# Patient Record
Sex: MALE | Race: WHITE | NOT HISPANIC OR LATINO | URBAN - METROPOLITAN AREA
[De-identification: names, ages, dates, MRNs, and addresses within clinical notes are randomized per-mention and may not be internally consistent; named-entity substitution may affect disease eponyms.]

---

## 2017-02-28 ENCOUNTER — OUTPATIENT (OUTPATIENT)
Dept: OUTPATIENT SERVICES | Facility: HOSPITAL | Age: 72
LOS: 1 days | Discharge: HOME | End: 2017-02-28

## 2017-06-27 DIAGNOSIS — R94.31 ABNORMAL ELECTROCARDIOGRAM [ECG] [EKG]: ICD-10-CM

## 2021-10-28 ENCOUNTER — INPATIENT (INPATIENT)
Facility: HOSPITAL | Age: 76
LOS: 1 days | Discharge: HOME HEALTH | End: 2021-10-30
Attending: INTERNAL MEDICINE | Admitting: INTERNAL MEDICINE
Payer: MEDICARE

## 2021-10-28 VITALS
TEMPERATURE: 98 F | OXYGEN SATURATION: 98 % | DIASTOLIC BLOOD PRESSURE: 78 MMHG | RESPIRATION RATE: 18 BRPM | HEART RATE: 75 BPM | SYSTOLIC BLOOD PRESSURE: 148 MMHG

## 2021-10-28 LAB
ALBUMIN SERPL ELPH-MCNC: 4.1 G/DL — SIGNIFICANT CHANGE UP (ref 3.5–5.2)
ALP SERPL-CCNC: 98 U/L — SIGNIFICANT CHANGE UP (ref 30–115)
ALT FLD-CCNC: <5 U/L — SIGNIFICANT CHANGE UP (ref 0–41)
ANION GAP SERPL CALC-SCNC: 12 MMOL/L — SIGNIFICANT CHANGE UP (ref 7–14)
AST SERPL-CCNC: 14 U/L — SIGNIFICANT CHANGE UP (ref 0–41)
BASOPHILS # BLD AUTO: 0.03 K/UL — SIGNIFICANT CHANGE UP (ref 0–0.2)
BASOPHILS NFR BLD AUTO: 0.4 % — SIGNIFICANT CHANGE UP (ref 0–1)
BILIRUB SERPL-MCNC: 0.8 MG/DL — SIGNIFICANT CHANGE UP (ref 0.2–1.2)
BUN SERPL-MCNC: 18 MG/DL — SIGNIFICANT CHANGE UP (ref 10–20)
CALCIUM SERPL-MCNC: 9.1 MG/DL — SIGNIFICANT CHANGE UP (ref 8.5–10.1)
CHLORIDE SERPL-SCNC: 102 MMOL/L — SIGNIFICANT CHANGE UP (ref 98–110)
CO2 SERPL-SCNC: 25 MMOL/L — SIGNIFICANT CHANGE UP (ref 17–32)
CREAT SERPL-MCNC: 1.1 MG/DL — SIGNIFICANT CHANGE UP (ref 0.7–1.5)
EOSINOPHIL # BLD AUTO: 0.04 K/UL — SIGNIFICANT CHANGE UP (ref 0–0.7)
EOSINOPHIL NFR BLD AUTO: 0.5 % — SIGNIFICANT CHANGE UP (ref 0–8)
GLUCOSE SERPL-MCNC: 110 MG/DL — HIGH (ref 70–99)
HCT VFR BLD CALC: 45 % — SIGNIFICANT CHANGE UP (ref 42–52)
HGB BLD-MCNC: 15.4 G/DL — SIGNIFICANT CHANGE UP (ref 14–18)
IMM GRANULOCYTES NFR BLD AUTO: 0.4 % — HIGH (ref 0.1–0.3)
LYMPHOCYTES # BLD AUTO: 0.86 K/UL — LOW (ref 1.2–3.4)
LYMPHOCYTES # BLD AUTO: 11.4 % — LOW (ref 20.5–51.1)
MAGNESIUM SERPL-MCNC: 2.3 MG/DL — SIGNIFICANT CHANGE UP (ref 1.8–2.4)
MCHC RBC-ENTMCNC: 30.8 PG — SIGNIFICANT CHANGE UP (ref 27–31)
MCHC RBC-ENTMCNC: 34.2 G/DL — SIGNIFICANT CHANGE UP (ref 32–37)
MCV RBC AUTO: 90 FL — SIGNIFICANT CHANGE UP (ref 80–94)
MONOCYTES # BLD AUTO: 0.43 K/UL — SIGNIFICANT CHANGE UP (ref 0.1–0.6)
MONOCYTES NFR BLD AUTO: 5.7 % — SIGNIFICANT CHANGE UP (ref 1.7–9.3)
NEUTROPHILS # BLD AUTO: 6.16 K/UL — SIGNIFICANT CHANGE UP (ref 1.4–6.5)
NEUTROPHILS NFR BLD AUTO: 81.6 % — HIGH (ref 42.2–75.2)
NRBC # BLD: 0 /100 WBCS — SIGNIFICANT CHANGE UP (ref 0–0)
PLATELET # BLD AUTO: 203 K/UL — SIGNIFICANT CHANGE UP (ref 130–400)
POTASSIUM SERPL-MCNC: 4.8 MMOL/L — SIGNIFICANT CHANGE UP (ref 3.5–5)
POTASSIUM SERPL-SCNC: 4.8 MMOL/L — SIGNIFICANT CHANGE UP (ref 3.5–5)
PROT SERPL-MCNC: 6.8 G/DL — SIGNIFICANT CHANGE UP (ref 6–8)
RBC # BLD: 5 M/UL — SIGNIFICANT CHANGE UP (ref 4.7–6.1)
RBC # FLD: 12.8 % — SIGNIFICANT CHANGE UP (ref 11.5–14.5)
SARS-COV-2 RNA SPEC QL NAA+PROBE: SIGNIFICANT CHANGE UP
SODIUM SERPL-SCNC: 139 MMOL/L — SIGNIFICANT CHANGE UP (ref 135–146)
TROPONIN T SERPL-MCNC: <0.01 NG/ML — SIGNIFICANT CHANGE UP
WBC # BLD: 7.55 K/UL — SIGNIFICANT CHANGE UP (ref 4.8–10.8)
WBC # FLD AUTO: 7.55 K/UL — SIGNIFICANT CHANGE UP (ref 4.8–10.8)

## 2021-10-28 PROCEDURE — 99223 1ST HOSP IP/OBS HIGH 75: CPT

## 2021-10-28 PROCEDURE — 70450 CT HEAD/BRAIN W/O DYE: CPT | Mod: 26,MA

## 2021-10-28 PROCEDURE — 93010 ELECTROCARDIOGRAM REPORT: CPT

## 2021-10-28 PROCEDURE — 71045 X-RAY EXAM CHEST 1 VIEW: CPT | Mod: 26,77

## 2021-10-28 PROCEDURE — 99285 EMERGENCY DEPT VISIT HI MDM: CPT

## 2021-10-28 PROCEDURE — 71045 X-RAY EXAM CHEST 1 VIEW: CPT | Mod: 26

## 2021-10-28 PROCEDURE — 72170 X-RAY EXAM OF PELVIS: CPT | Mod: 26

## 2021-10-28 RX ORDER — CARBIDOPA AND LEVODOPA 25; 100 MG/1; MG/1
1 TABLET ORAL
Qty: 0 | Refills: 0 | DISCHARGE

## 2021-10-28 RX ORDER — LINACLOTIDE 145 UG/1
1 CAPSULE, GELATIN COATED ORAL
Qty: 0 | Refills: 0 | DISCHARGE

## 2021-10-28 RX ORDER — CARBIDOPA AND LEVODOPA 25; 100 MG/1; MG/1
1 TABLET ORAL
Refills: 0 | Status: DISCONTINUED | OUTPATIENT
Start: 2021-10-28 | End: 2021-10-30

## 2021-10-28 RX ORDER — TETANUS TOXOID, REDUCED DIPHTHERIA TOXOID AND ACELLULAR PERTUSSIS VACCINE, ADSORBED 5; 2.5; 8; 8; 2.5 [IU]/.5ML; [IU]/.5ML; UG/.5ML; UG/.5ML; UG/.5ML
0.5 SUSPENSION INTRAMUSCULAR ONCE
Refills: 0 | Status: COMPLETED | OUTPATIENT
Start: 2021-10-28 | End: 2021-10-28

## 2021-10-28 RX ORDER — MIDODRINE HYDROCHLORIDE 2.5 MG/1
5 TABLET ORAL THREE TIMES A DAY
Refills: 0 | Status: DISCONTINUED | OUTPATIENT
Start: 2021-10-28 | End: 2021-10-30

## 2021-10-28 RX ORDER — FLUDROCORTISONE ACETATE 0.1 MG/1
0.1 TABLET ORAL DAILY
Refills: 0 | Status: DISCONTINUED | OUTPATIENT
Start: 2021-10-28 | End: 2021-10-30

## 2021-10-28 RX ORDER — ENOXAPARIN SODIUM 100 MG/ML
40 INJECTION SUBCUTANEOUS DAILY
Refills: 0 | Status: DISCONTINUED | OUTPATIENT
Start: 2021-10-28 | End: 2021-10-30

## 2021-10-28 RX ORDER — FLUDROCORTISONE ACETATE 0.1 MG/1
1 TABLET ORAL
Qty: 0 | Refills: 0 | DISCHARGE

## 2021-10-28 RX ORDER — CHLORHEXIDINE GLUCONATE 213 G/1000ML
1 SOLUTION TOPICAL ONCE
Refills: 0 | Status: DISCONTINUED | OUTPATIENT
Start: 2021-10-28 | End: 2021-10-30

## 2021-10-28 RX ADMIN — TETANUS TOXOID, REDUCED DIPHTHERIA TOXOID AND ACELLULAR PERTUSSIS VACCINE, ADSORBED 0.5 MILLILITER(S): 5; 2.5; 8; 8; 2.5 SUSPENSION INTRAMUSCULAR at 11:57

## 2021-10-28 RX ADMIN — CARBIDOPA AND LEVODOPA 1 TABLET(S): 25; 100 TABLET ORAL at 23:23

## 2021-10-28 RX ADMIN — ENOXAPARIN SODIUM 40 MILLIGRAM(S): 100 INJECTION SUBCUTANEOUS at 23:23

## 2021-10-28 RX ADMIN — CARBIDOPA AND LEVODOPA 1 TABLET(S): 25; 100 TABLET ORAL at 18:11

## 2021-10-28 RX ADMIN — MIDODRINE HYDROCHLORIDE 5 MILLIGRAM(S): 2.5 TABLET ORAL at 18:10

## 2021-10-28 NOTE — H&P ADULT - NSHPLABSRESULTS_GEN_ALL_CORE
LABS:                        15.4   7.55  )-----------( 203      ( 28 Oct 2021 10:43 )             45.0     10-28    139  |  102  |  18  ----------------------------<  110<H>  4.8   |  25  |  1.1    Ca    9.1      28 Oct 2021 10:43  Mg     2.3     10-28    TPro  6.8  /  Alb  4.1  /  TBili  0.8  /  DBili  x   /  AST  14  /  ALT  <5  /  AlkPhos  98  10-28      Troponin T, Serum: <0.01 ng/mL (10-28-21 @ 10:43)    CARDIAC MARKERS ( 28 Oct 2021 10:43 )  x     / <0.01 ng/mL / x     / x     / x          RADIOLOGY:  10/28 CT Head   IMPRESSION:  No acute intracranial pathology. No evidence of midline shift, mass effect or intracranial hemorrhage.    10/28 CXR-   Impression:    No focal pulmonary consolidation. Interface along the left lateral hemithorax, favored to represent a skin fold but pneumothorax not entirely excluded. Consider repeat radiographic assessment.    10/28 XR Pelvis -   Findings/  impression:    No definitive evidence of acute displaced fracture.

## 2021-10-28 NOTE — H&P ADULT - HISTORY OF PRESENT ILLNESS
77 y/o male with Parkinson's disease, macular degeneration (R eye), orthostatic hypotension presents to [facility] after a series of falls/syncopal episodes. Patient states that over the last three years since he was diagnosed with Parkinson's in 2019 he has had multiple instances of dizziness/fainting. His primary physician/neurologist are aware of this and have prescribed him droxidopa (northera) as well as fludrocortisone in addition to his anti-parkinsonian medications. Despite these pharmacological interventions, he continues to have periodic episodes of dizziness/fainting when arising from a seated position. This week he has had two episodes, each happened overnight. He does not recall the episodes, but patient's wife witnessed various small lacerations on his face and determined that he had fell. The first episode happened on Tuesday night - fall was unwitnessed, but patient's wife called EMS. Vitals were fine and he refused transport to hospital. The second episode happened this morning resulting in more significant lacerations to his face. His wife finally convinced him to come to the hospital. He denies any fevers/chills, chest pain, SOB, nausea, vomiting, abdominal pain, etc. Only positive on ROS is dizziness/lightheadedness when arising from seated position.     Of note, patient's neurologist (who is imminently retiring) is aware of these orthostatic hypotension issues and is in the process of sending midodrine to his home. Patient was told to stop taking the droxidopa when they arrive.     In the ED, vitals were /78, HR 75, RR 18, T 98, SPO2 98% on RA. Admitted to medicine for further management.

## 2021-10-28 NOTE — H&P ADULT - ATTENDING COMMENTS
77 YO M with a PMH of Parkinson's disease, macular degeneration (R eye), and hx of orthostatic hypotension who presents to the hospital with a c/o x 2 syncopal episodes that occurred over the past x 1 week. + HT, + LOC. Pt does not remember the episodes but wakes up w/ minor scrapes and abrasions to his face. No bowel/bladder incontinence, no shaking, and no tongue biting. Denies any confusion post-event. ROS negative for fevers/chills, sore throat, ABD pain, N/V/D, LE swelling, or rashes.     Of note, pt w/ a 2 year hx of syncopal episodes due to orthostasis. His Neurologist is in the process of adjusting his medications, currently on dranidopa and is being switched to midodrine.     In the ED, CTH was negative for acute process. XR of pelvis was negative for acute process. The cardiac enzymes were negative and the EKG showed no ischemic changes.     FMHx: Reviewed, not relevant    Physical exam shows pt in NAD. VSS, afebrile, not hypoxic on RA. A&Ox3, follows verbal commands. Neuro exam intact, no focal weakness. CTA B/L with no W/C/R. RRR, no M/G/R. ABD is soft and non-tender, normoactive BSs. LEs without swelling. No rashes. Labs and radiology as above.     Multiple syncopal episodes, likely orthostatic; doubt cardiac/seizure. Tele admit. Serial cardiac enzymes/EKGs. Check orthostatics. PT eval. Fall precautions.   -Start pt on Midodrine in the hospital    Hx of Parkinson's disease and macular degeneration (R eye). Restart home meds, except as stated above. DVT PPX. Inform PCP of pt's admission to hospital. My note supersedes the residents note.

## 2021-10-28 NOTE — H&P ADULT - NSICDXPASTMEDICALHX_GEN_ALL_CORE_FT
PAST MEDICAL HISTORY:  H/O orthostatic hypotension     H/O Parkinson's disease     History of macular degeneration Right eye

## 2021-10-28 NOTE — ED PROVIDER NOTE - OBJECTIVE STATEMENT
76 y.o. male with a PMH of Parkinson's disease and orthostatic hypotension presented to the ER c/o syncopal episodes for the past two night in a row.  This morning wife found him on the floor with a head injury.  Pt does not recall what happened last night or the night before.  Wife reports that this never has happened.  Denies chest pain, dizziness, fever, chills, abdominal pain, dizziness, chest pain, SOB.  Feels "fine."  Tetanus not up to date.  No other complaints.

## 2021-10-28 NOTE — ED PROVIDER NOTE - ATTENDING CONTRIBUTION TO CARE
77 y/o male h/o parkinson's, orthostatic hypotension / syncope, lives at home with wife, walks w/o assistance presents s/p fall last night and more frequent falls recently. Pt states he woke up this morning with injury to rt side of head and blood on bathroom floor, but does not remember events, now reports pain to side of head, denies modifying factors, other injuries or complaints at present.    Old chart reviewed.  I have reviewed and agree with the initial nursing note, except as documented in my note.    VSS, awake, alert, non-toxic appearing, lying comfortably in stretcher, in NAD, small superficial laceration and bruising to rt temple, oropharynx clear, mmm, no JVD or bruit, chest CTAB, non-labored breathing, no w/r/r, +S1/S2, RRR, no m/r/g, abdomen soft, NT, ND, +BS, no peripheral edema or deformities, alert, clear speech.

## 2021-10-28 NOTE — H&P ADULT - NSHPPHYSICALEXAM_GEN_ALL_CORE
VITALS:   T(F): 98  HR: 75  BP: 148/78  RR: 18  SpO2: 98%    PHYSICAL EXAM:  GENERAL: NAD, speaks in full sentences, no signs of respiratory distress  HEAD: Some minor lacerations to right side of face; wound care provided by ED  NECK: Supple  CHEST/LUNG: Clear to auscultation bilaterally; No wheeze or crackles  HEART: S1, S2; RRR; No murmurs, rubs, or gallops  ABDOMEN: BS+; Soft, Non-tender, Non-distended  EXTREMITIES:  2+ Peripheral Pulses, No clubbing, cyanosis, or edema  PSYCH: AAOx3  NEUROLOGY: Parkinsonian tremor noted  SKIN: No rashes or lesions

## 2021-10-28 NOTE — ED ADULT NURSE NOTE - NSIMPLEMENTINTERV_GEN_ALL_ED
Implemented All Fall with Harm Risk Interventions:  [place] to call system. Call bell, personal items and telephone within reach. Instruct patient to call for assistance. Room bathroom lighting operational. Non-slip footwear when patient is off stretcher. Physically safe environment: no spills, clutter or unnecessary equipment. Stretcher in lowest position, wheels locked, appropriate side rails in place. Provide visual cue, wrist band, yellow gown, etc. Monitor gait and stability. Monitor for mental status changes and reorient to person, place, and time. Review medications for side effects contributing to fall risk. Reinforce activity limits and safety measures with patient and family. Provide visual clues: red socks.

## 2021-10-28 NOTE — H&P ADULT - ASSESSMENT
75 y/o male with Parkinson's disease, macular degeneration (R eye), orthostatic hypotension presents to [facility] after a series of falls/syncopal episodes.     #) Fall/syncope secondary to symptomatic orthostatic hypotension   - Known issue for patient, has been on droxidopa and fludrocortisone but remains symptomatic   - PCP/neurologist had planned for him to stop droxidopa and start midodrine (has not received midodrine pills yet)   - Can measure orthostatic vitals   - Will start midodrine here since droxidopa is not offered inpatient (is also extremely expensive)  - Monitor on telemetry to evaluate for any potential arrhythmia that may have precipitated his event (relatively low suspicion given history - symptoms always happen when arising from lying/seated position)   - PT/OT    #) Low suspicion pneumothorax  - Radiologist notes that an "interface" along the left lateral hemithorax exists that likely represents a skin-fold, but "pneumothorax not entirely excluded"  - Will order repeat CXR for comparison   - No suspicion of pneumothorax on physical exam- patient is alert, oriented, not short of breath, has no labored breathing, and is eager to get out of bed     #) Parkinson's disease  - Can continue carbidopa/levodopa at current dose    #) Macular degeneration  - Outpatient management     #) Diet - regular  #) DVT prophylaxis - lovenox 40mg sub-q QD  #) Disposition- telemetry   #) Activity- increase as tolerated  #) Code status - Full  77 y/o male with Parkinson's disease, macular degeneration (R eye), orthostatic hypotension presents to [facility] after a series of falls/syncopal episodes.     #) Fall/syncope secondary to symptomatic orthostatic hypotension   - Known issue for patient, has been on droxidopa and fludrocortisone but remains symptomatic   - PCP/neurologist had planned for him to stop droxidopa and start midodrine (has not received midodrine pills yet)   - Can measure orthostatic vitals   - Will start midodrine here since droxidopa is not offered inpatient (is also extremely expensive)  - Monitor on telemetry to evaluate for any potential arrhythmia that may have precipitated his event (relatively low suspicion given history - symptoms always happen when arising from lying/seated position)   - PT/OT    #) Low suspicion pneumothorax  - Radiologist notes that an "interface" along the left lateral hemithorax exists that likely represents a skin-fold, but "pneumothorax not entirely excluded"  - Will order repeat CXR for comparison   - No suspicion of pneumothorax on physical exam- patient is alert, oriented, not short of breath, has no labored breathing, equal breath sounds bilaterally and is eager to get out of bed     #) Parkinson's disease  - Can continue carbidopa/levodopa at current dose    #) Macular degeneration  - Outpatient management     #) Diet - regular  #) DVT prophylaxis - lovenox 40mg sub-q QD  #) Disposition- telemetry   #) Activity- increase as tolerated  #) Code status - Full

## 2021-10-28 NOTE — ED PROVIDER NOTE - PROGRESS NOTE DETAILS
BH: H/O orthostatic hypotension / syncope, likely had similar event overnight, however patient and wife do not feel safe to be DC home due to inc in falls recently and subsequent injuries.

## 2021-10-28 NOTE — ED PROVIDER NOTE - ENMT, MLM
(+) Hematoma with abrasion Right forehead, Airway patent, Nasal mucosa clear. Mouth with normal mucosa. Throat has no vesicles, no oropharyngeal exudates and uvula is midline.

## 2021-10-29 LAB
ALBUMIN SERPL ELPH-MCNC: 3.9 G/DL — SIGNIFICANT CHANGE UP (ref 3.5–5.2)
ALP SERPL-CCNC: 93 U/L — SIGNIFICANT CHANGE UP (ref 30–115)
ALT FLD-CCNC: <5 U/L — SIGNIFICANT CHANGE UP (ref 0–41)
ANION GAP SERPL CALC-SCNC: 12 MMOL/L — SIGNIFICANT CHANGE UP (ref 7–14)
AST SERPL-CCNC: 13 U/L — SIGNIFICANT CHANGE UP (ref 0–41)
BASOPHILS # BLD AUTO: 0.04 K/UL — SIGNIFICANT CHANGE UP (ref 0–0.2)
BASOPHILS NFR BLD AUTO: 0.6 % — SIGNIFICANT CHANGE UP (ref 0–1)
BILIRUB SERPL-MCNC: 1 MG/DL — SIGNIFICANT CHANGE UP (ref 0.2–1.2)
BUN SERPL-MCNC: 19 MG/DL — SIGNIFICANT CHANGE UP (ref 10–20)
CALCIUM SERPL-MCNC: 8.7 MG/DL — SIGNIFICANT CHANGE UP (ref 8.5–10.1)
CHLORIDE SERPL-SCNC: 103 MMOL/L — SIGNIFICANT CHANGE UP (ref 98–110)
CO2 SERPL-SCNC: 25 MMOL/L — SIGNIFICANT CHANGE UP (ref 17–32)
COVID-19 SPIKE DOMAIN AB INTERP: POSITIVE
COVID-19 SPIKE DOMAIN ANTIBODY RESULT: 103 U/ML — HIGH
CREAT SERPL-MCNC: 1 MG/DL — SIGNIFICANT CHANGE UP (ref 0.7–1.5)
EOSINOPHIL # BLD AUTO: 0.1 K/UL — SIGNIFICANT CHANGE UP (ref 0–0.7)
EOSINOPHIL NFR BLD AUTO: 1.4 % — SIGNIFICANT CHANGE UP (ref 0–8)
GLUCOSE BLDC GLUCOMTR-MCNC: 97 MG/DL — SIGNIFICANT CHANGE UP (ref 70–99)
GLUCOSE SERPL-MCNC: 108 MG/DL — HIGH (ref 70–99)
HCT VFR BLD CALC: 44.3 % — SIGNIFICANT CHANGE UP (ref 42–52)
HCV AB S/CO SERPL IA: 0.03 COI — SIGNIFICANT CHANGE UP
HCV AB SERPL-IMP: SIGNIFICANT CHANGE UP
HGB BLD-MCNC: 14.7 G/DL — SIGNIFICANT CHANGE UP (ref 14–18)
IMM GRANULOCYTES NFR BLD AUTO: 0.3 % — SIGNIFICANT CHANGE UP (ref 0.1–0.3)
LYMPHOCYTES # BLD AUTO: 1.39 K/UL — SIGNIFICANT CHANGE UP (ref 1.2–3.4)
LYMPHOCYTES # BLD AUTO: 19.8 % — LOW (ref 20.5–51.1)
MAGNESIUM SERPL-MCNC: 2.3 MG/DL — SIGNIFICANT CHANGE UP (ref 1.8–2.4)
MCHC RBC-ENTMCNC: 29.9 PG — SIGNIFICANT CHANGE UP (ref 27–31)
MCHC RBC-ENTMCNC: 33.2 G/DL — SIGNIFICANT CHANGE UP (ref 32–37)
MCV RBC AUTO: 90 FL — SIGNIFICANT CHANGE UP (ref 80–94)
MONOCYTES # BLD AUTO: 0.64 K/UL — HIGH (ref 0.1–0.6)
MONOCYTES NFR BLD AUTO: 9.1 % — SIGNIFICANT CHANGE UP (ref 1.7–9.3)
NEUTROPHILS # BLD AUTO: 4.83 K/UL — SIGNIFICANT CHANGE UP (ref 1.4–6.5)
NEUTROPHILS NFR BLD AUTO: 68.8 % — SIGNIFICANT CHANGE UP (ref 42.2–75.2)
NRBC # BLD: 0 /100 WBCS — SIGNIFICANT CHANGE UP (ref 0–0)
PLATELET # BLD AUTO: 200 K/UL — SIGNIFICANT CHANGE UP (ref 130–400)
POTASSIUM SERPL-MCNC: 4.5 MMOL/L — SIGNIFICANT CHANGE UP (ref 3.5–5)
POTASSIUM SERPL-SCNC: 4.5 MMOL/L — SIGNIFICANT CHANGE UP (ref 3.5–5)
PROT SERPL-MCNC: 6.4 G/DL — SIGNIFICANT CHANGE UP (ref 6–8)
RBC # BLD: 4.92 M/UL — SIGNIFICANT CHANGE UP (ref 4.7–6.1)
RBC # FLD: 12.6 % — SIGNIFICANT CHANGE UP (ref 11.5–14.5)
SARS-COV-2 IGG+IGM SERPL QL IA: 103 U/ML — HIGH
SARS-COV-2 IGG+IGM SERPL QL IA: POSITIVE
SODIUM SERPL-SCNC: 140 MMOL/L — SIGNIFICANT CHANGE UP (ref 135–146)
TSH SERPL-MCNC: 0.96 UIU/ML — SIGNIFICANT CHANGE UP (ref 0.27–4.2)
WBC # BLD: 7.02 K/UL — SIGNIFICANT CHANGE UP (ref 4.8–10.8)
WBC # FLD AUTO: 7.02 K/UL — SIGNIFICANT CHANGE UP (ref 4.8–10.8)

## 2021-10-29 PROCEDURE — 71045 X-RAY EXAM CHEST 1 VIEW: CPT | Mod: 26

## 2021-10-29 PROCEDURE — 99233 SBSQ HOSP IP/OBS HIGH 50: CPT

## 2021-10-29 PROCEDURE — 93010 ELECTROCARDIOGRAM REPORT: CPT

## 2021-10-29 RX ADMIN — Medication 1 TABLET(S): at 12:09

## 2021-10-29 RX ADMIN — ENOXAPARIN SODIUM 40 MILLIGRAM(S): 100 INJECTION SUBCUTANEOUS at 12:09

## 2021-10-29 RX ADMIN — MIDODRINE HYDROCHLORIDE 5 MILLIGRAM(S): 2.5 TABLET ORAL at 12:09

## 2021-10-29 RX ADMIN — CARBIDOPA AND LEVODOPA 1 TABLET(S): 25; 100 TABLET ORAL at 18:49

## 2021-10-29 RX ADMIN — CARBIDOPA AND LEVODOPA 1 TABLET(S): 25; 100 TABLET ORAL at 23:36

## 2021-10-29 RX ADMIN — CARBIDOPA AND LEVODOPA 1 TABLET(S): 25; 100 TABLET ORAL at 05:49

## 2021-10-29 RX ADMIN — CARBIDOPA AND LEVODOPA 1 TABLET(S): 25; 100 TABLET ORAL at 12:09

## 2021-10-29 RX ADMIN — FLUDROCORTISONE ACETATE 0.1 MILLIGRAM(S): 0.1 TABLET ORAL at 05:49

## 2021-10-29 NOTE — PHYSICAL THERAPY INITIAL EVALUATION ADULT - LEVEL OF INDEPENDENCE: GAIT, REHAB EVAL
See above. Despite max education of safety of amb pt impulsive & repeatedly states, I'm fine lets just walk. Therapist & pt's wife repeatedly educated pt on safety concersn 2* pt with symptomatic low BP. RN Alice aware./unable to perform

## 2021-10-29 NOTE — OCCUPATIONAL THERAPY INITIAL EVALUATION ADULT - BED MOBILITY/TRANSFERS, PREVIOUS LEVEL OF FUNCTION, OT EVAL
cane or walker in house as needed for approx 1 mo, utilizes walker in the community/independent/needs device

## 2021-10-29 NOTE — PHYSICAL THERAPY INITIAL EVALUATION ADULT - MANUAL MUSCLE TESTING RESULTS, REHAB EVAL
grossly 3/5 t/o
[facility] Hearing Screen Program/Tdap Vaccination (VIS Pub Date: 2012)/Breastfeeding Guide and Packet/[facility]  Screening Program/  Immunization Record/Guide to Postpartum Care/Birth Certificate Instructions/Discharge Medication Information for Patients and Families Pocket Guide

## 2021-10-29 NOTE — OCCUPATIONAL THERAPY INITIAL EVALUATION ADULT - GENERAL OBSERVATIONS, REHAB EVAL
Pt encountered semi bliss in bed. in NAD. +TELE, +IV lock. Agreeable to OT IE. Pt c/o mild dizziness upon sitting EOB, symptoms fully resolved with light LE movement seated EOB within 10-15 seconds. Pt asymptomatic for remainder of session. Pt left in b/s chair, +chair alarm, in NAD. Call bell in reach. RN aware.

## 2021-10-29 NOTE — DISCHARGE NOTE PROVIDER - CARE PROVIDERS DIRECT ADDRESSES
[email or web address],DirectAddress_Unknown,DirectAddress_Unknown

## 2021-10-29 NOTE — DISCHARGE NOTE PROVIDER - NSDCMRMEDTOKEN_GEN_ALL_CORE_FT
carbidopa-levodopa 25 mg-100 mg oral tablet: 1 tab(s) orally 4 times a day  fludrocortisone 0.1 mg oral tablet: 1 tab(s) orally once a day  Linzess 290 mcg oral capsule: 1 cap(s) orally once a day  Multiple Vitamins oral tablet: 1 tab(s) orally once a day  Northera 100 mg oral capsule: 1 cap(s) orally 4 times a day   carbidopa-levodopa 25 mg-100 mg oral tablet: 1 tab(s) orally 4 times a day  fludrocortisone 0.1 mg oral tablet: 1 tab(s) orally once a day  Linzess 290 mcg oral capsule: 1 cap(s) orally once a day  midodrine 5 mg oral tablet: 1 tab(s) orally 2 times a day   Multiple Vitamins oral tablet: 1 tab(s) orally once a day  Northera 100 mg oral capsule: 2 cap(s) orally 3 times a day, increase the dose by 100mg every 2 days to reach 600mg TID.

## 2021-10-29 NOTE — DISCHARGE NOTE PROVIDER - NSDCCPCAREPLAN_GEN_ALL_CORE_FT
PRINCIPAL DISCHARGE DIAGNOSIS  Diagnosis: Syncope  Assessment and Plan of Treatment: Syncope is when you temporarily lose consciousness, also called fainting or passing out. It is caused by a sudden decrease in blood flow to the brain. Even though most causes of syncope are not dangerous, syncope can possibly be a sign of a serious medical problem. Signs that you may be about to faint include feeling dizzy, lightheaded, nausea, visual changes, or cold/clammy skin. Do not drive, operate heavy machinery, or play sports until your health care provider says it is okay.  SEEK IMMEDIATE MEDICAL CARE IF YOU HAVE ANY OF THE FOLLOWING SYMPTOMS: severe headache, pain in your chest/abdomen/back, bleeding from your mouth or rectum, palpitations, shortness of breath, pain with breathing, seizure, confusion, or trouble walking.      SECONDARY DISCHARGE DIAGNOSES  Diagnosis: Parkinson disease  Assessment and Plan of Treatment: Please follow-up with your Neurologist (Dr. Barahona) regarding your Parkinson's disease medications.

## 2021-10-29 NOTE — PHYSICAL THERAPY INITIAL EVALUATION ADULT - GENERAL OBSERVATIONS, REHAB EVAL
13:20-14:11 Pt encountered sitting in b/s chair with tele, IV lock, ABD binder, B/L SERGEI stockings, wife  @b/s, in NAD. Pt without c/o, agreeable to PT. BP: 119/62, HR: 76bpm. Pt left same as found with tele, IV lock, wife @ b/s, in NAD. BP: 141/66, HR: 77bpm. HANNA grijalva. 13:20-14:11 Pt encountered sitting in b/s chair with tele, IV lock, ABD binder, B/L SERGEI stockings, wife  @b/s, in NAD. Pt without c/o, agreeable to PT. BP: 119/62, HR: 76bpm. Pt left same as found with tele, IV lock, ABD binder, B/L SERGEI stockings, wife @ b/s, in NAD. BP: 141/66, HR: 77bpm. HANNA grijalva.

## 2021-10-29 NOTE — OCCUPATIONAL THERAPY INITIAL EVALUATION ADULT - PERTINENT HX OF CURRENT PROBLEM, REHAB EVAL
Pt is a R hand dominant male with hx of Parkinson's (dx in 2019) and symptomatic orthostatic hypotension with multiple instances of dizziness and fainting.  The week leading up to admission pt had 2 falls at home but does not remember details leading up to or directly following the events.

## 2021-10-29 NOTE — OCCUPATIONAL THERAPY INITIAL EVALUATION ADULT - SHORT TERM MEMORY, REHAB EVAL
Pt able to repeat 3/3 words, recall 1/3 words I and another with cue. Pt recalled and utilized therapists name consistently during session

## 2021-10-29 NOTE — DISCHARGE NOTE PROVIDER - CARE PROVIDER_API CALL
Nabeel Portillo)  Geriatric Medicine; Internal Medicine  [address] [place] [address]  Phone: [phone]  Fax: [phone]  Follow Up Time: 2 weeks    Sandy Barahona  Phone: (   )    -  Fax: (   )    -  Follow Up Time: 1 week    sandy barahona  Phone: (   )    -  Fax: (   )    -  Established Patient  Follow Up Time:

## 2021-10-29 NOTE — PROGRESS NOTE ADULT - SUBJECTIVE AND OBJECTIVE BOX
CALEB ENCARNACION  76y  Male      Patient is a 76y old  Male who presents with a chief complaint of syncope/fall (29 Oct 2021 11:06)      INTERVAL HPI/OVERNIGHT EVENTS:  He feels ok, no dizziness, no chest pain or SOB.   Vital Signs Last 24 Hrs  T(C): 36.5 (29 Oct 2021 13:36), Max: 36.5 (28 Oct 2021 21:09)  T(F): 97.7 (29 Oct 2021 13:36), Max: 97.7 (28 Oct 2021 21:09)  HR: 71 (29 Oct 2021 15:47) (62 - 76)  BP: 140/70 (29 Oct 2021 15:47) (72/54 - 153/63)  BP(mean): --  RR: 18 (29 Oct 2021 13:36) (18 - 18)  SpO2: --      10-28-21 @ 07:01  -  10-29-21 @ 07:00  --------------------------------------------------------  IN: 120 mL / OUT: 700 mL / NET: -580 mL    10-29-21 @ 07:01  -  10-29-21 @ 17:11  --------------------------------------------------------  IN: 300 mL / OUT: 275 mL / NET: 25 mL            Consultant(s) Notes Reviewed:  [x ] YES  [ ] NO          MEDICATIONS  (STANDING):  carbidopa/levodopa  25/100 1 Tablet(s) Oral four times a day  chlorhexidine 4% Liquid 1 Application(s) Topical once  enoxaparin Injectable 40 milliGRAM(s) SubCutaneous daily  fludroCORTISONE 0.1 milliGRAM(s) Oral daily  midodrine. 5 milliGRAM(s) Oral three times a day  multivitamin 1 Tablet(s) Oral daily    MEDICATIONS  (PRN):      LABS                          14.7   7.02  )-----------( 200      ( 29 Oct 2021 06:35 )             44.3     10-29    140  |  103  |  19  ----------------------------<  108<H>  4.5   |  25  |  1.0    Ca    8.7      29 Oct 2021 06:35  Mg     2.3     10-29    TPro  6.4  /  Alb  3.9  /  TBili  1.0  /  DBili  x   /  AST  13  /  ALT  <5  /  AlkPhos  93  10-29          Lactate Trend    CARDIAC MARKERS ( 28 Oct 2021 10:43 )  x     / <0.01 ng/mL / x     / x     / x          CAPILLARY BLOOD GLUCOSE      POCT Blood Glucose.: 97 mg/dL (29 Oct 2021 08:01)        RADIOLOGY & ADDITIONAL TESTS:    Imaging Personally Reviewed:  [ ] YES  [ ] NO    HEALTH ISSUES - PROBLEM Dx:          PHYSICAL EXAM:  GENERAL: NAD, well-developed.  HEAD:  Atraumatic, Normocephalic.  EYES: EOMI, PERRLA, conjunctiva and sclera clear.  NECK: Supple, No JVD.  CHEST/LUNG: Clear to auscultation bilaterally; No wheeze.  HEART: Regular rate and rhythm; S1 S2.   ABDOMEN: Soft, Nontender, Nondistended; Bowel sounds present.  EXTREMITIES:  2+ Peripheral Pulses, No clubbing, cyanosis, or edema.  PSYCH: AAOx3.  NEUROLOGY: non-focal.  SKIN: No rashes or lesions.

## 2021-10-29 NOTE — OCCUPATIONAL THERAPY INITIAL EVALUATION ADULT - PLANNED THERAPY INTERVENTIONS, OT EVAL
ADL retraining/balance training/bed mobility training/fine motor coordination training/joint mobilization/motor coordination training/ROM/strengthening/transfer training

## 2021-10-29 NOTE — OCCUPATIONAL THERAPY INITIAL EVALUATION ADULT - ORIENTATION, REHAB EVAL
grossly to date, pt also unable to recall details of fall or immediately after fall/oriented to person, place, time and situation

## 2021-10-29 NOTE — DISCHARGE NOTE PROVIDER - HOSPITAL COURSE
Patient is a 76 year old male with PMHx of Parkinson's disease, macular degeneration (R eye), orthostatic hypotension that presented after a series of falls/syncopal episodes. Patient states that over the last three years since he was diagnosed with Parkinson's in 2019 he has had multiple instances of dizziness/fainting. His primary physician/neurologist are aware of this and have prescribed him droxidopa (northera) as well as fludrocortisone in addition to his anti-Parkinsonian medications. Despite these pharmacological interventions, he continues to have periodic episodes of dizziness/fainting when arising from a seated position. This week he has had two episodes, each happened overnight. He does not recall the episodes, but patient's wife witnessed various small lacerations on his face and determined that he had fell. The first episode happened on Tuesday night - fall was unwitnessed, but patient's wife called EMS. Vitals were fine and he refused transport to hospital. The second episode happened this morning resulting in more significant lacerations to his face. His wife finally convinced him to come to the hospital. He denies any fevers/chills, chest pain, SOB, nausea, vomiting, abdominal pain, etc. Only positive on ROS is dizziness/lightheadedness when arising from seated position.     Of note, patient's neurologist (who is imminently retiring) is aware of these orthostatic hypotension issues and was in the process of sending midodrine to his home. Patient was told to stop taking the droxidopa when they arrive.    In the ED, vitals were /78, HR 75, RR 18, T 98, SPO2 98% on RA. Admitted to medicine for further management. Orthostatics positive, started on midodrine. Put on tele to evaluate for arrhythmias, low suspicion and no indication of any that occurred. PT/OT consulted. Stable for discharge.    LOS: 1d    VITALS:   T(C): 36.1 (10-29-21 @ 05:43), Max: 36.8 (10-28-21 @ 16:07)  HR: 62 (10-29-21 @ 05:43) (62 - 83)  BP: 153/63 (10-29-21 @ 05:43) (145/75 - 153/63)  RR: 18 (10-29-21 @ 05:43) (18 - 18)  SpO2: 98% (10-28-21 @ 16:07) (98% - 98%)    GENERAL: NAD, lying in bed comfortably  HEAD:  Atraumatic, Normocephalic  CHEST/LUNG: Clear to auscultation bilaterally; No rales, rhonchi, wheezing, or rubs. Unlabored respirations  HEART: Regular rate and rhythm; No murmurs, rubs, or gallops  ABDOMEN: BSx4; Soft, nontender, nondistended  EXTREMITIES:  2+ Peripheral Pulses, brisk capillary refill. No clubbing, cyanosis, or edema  NERVOUS SYSTEM:  A&Ox3, no focal deficits   SKIN: No rashes or lesions Patient is a 76 year old male with PMHx of Parkinson's disease, macular degeneration (R eye), orthostatic hypotension that presented after a series of falls/syncopal episodes. Patient states that over the last three years since he was diagnosed with Parkinson's in 2019 he has had multiple instances of dizziness/fainting. His primary physician/neurologist are aware of this and have prescribed him droxidopa (northera) as well as fludrocortisone in addition to his anti-Parkinsonian medications. Despite these pharmacological interventions, he continues to have periodic episodes of dizziness/fainting when arising from a seated position. This week he has had two episodes, each happened overnight. He does not recall the episodes, but patient's wife witnessed various small lacerations on his face and determined that he had fell. The first episode happened on Tuesday night - fall was unwitnessed, but patient's wife called EMS. Vitals were fine and he refused transport to hospital. The second episode happened this morning resulting in more significant lacerations to his face. His wife finally convinced him to come to the hospital. He denies any fevers/chills, chest pain, SOB, nausea, vomiting, abdominal pain, etc. Only positive on ROS is dizziness/lightheadedness when arising from seated position.     Of note, patient's neurologist (who is imminently retiring) is aware of these orthostatic hypotension issues and was in the process of sending midodrine to his home. Patient was told to stop taking the droxidopa when they arrive.    In the ED, vitals were /78, HR 75, RR 18, T 98, SPO2 98% on RA. Admitted to medicine for further management. Orthostatics positive, started on midodrine. Put on tele to evaluate for arrhythmias, low suspicion and no indication of any that occurred. PT/OT consulted. Stable for discharge.    LOS: 1d    VITALS:   T(C): 36.1 (10-29-21 @ 05:43), Max: 36.8 (10-28-21 @ 16:07)  HR: 62 (10-29-21 @ 05:43) (62 - 83)  BP: 153/63 (10-29-21 @ 05:43) (145/75 - 153/63)  RR: 18 (10-29-21 @ 05:43) (18 - 18)  SpO2: 98% (10-28-21 @ 16:07) (98% - 98%)    GENERAL: NAD, lying in bed comfortably  HEAD:  Atraumatic, Normocephalic  CHEST/LUNG: Clear to auscultation bilaterally; No rales, rhonchi, wheezing, or rubs. Unlabored respirations  HEART: Regular rate and rhythm; No murmurs, rubs, or gallops  ABDOMEN: BSx4; Soft, nontender, nondistended  EXTREMITIES:  2+ Peripheral Pulses, brisk capillary refill. No clubbing, cyanosis, or edema  NERVOUS SYSTEM:  A&Ox3, no focal deficits   SKIN: No rashes or lesions    A/P:   Recurrent Fall   Orthostatic Hypotension due to autonomic dysfunction, chronic.   Head trauma due to fall, no loss of consciousness.   Trauma work up   Still orthostatic, no other reason for orthostatic hypotension.   Continue Droxidopa may increase the dose to gradually 100mg TID every 48 hrs to reach 600mg TID. .   Continue Fludrocortisone 0.1mg daily.   add Midodrine 5mg BID as needed if Northera is not helpful.   Advised with high compressing stocking and abdominal binder all the daytime, can be removed when he go to sleep.   PT evaluation. Fall precaution.     Parkinson's disease:   Continue carbidopa/levodopa at current dose.  Follow up with his neurologist at [facility].     Macular degeneration  Outpatient management

## 2021-10-29 NOTE — PHYSICAL THERAPY INITIAL EVALUATION ADULT - IMPAIRED TRANSFERS: SIT/STAND, REHAB EVAL
dizziness & hypotension. Pt stood a total of 3 times, each time with c/o dizziness. 1st & 2nd time, unable to register BP (machine kept erroring out & pt requested to sit 2* dizziness). 3rd time BP: 72/45, HR: 75bpm in standing. Pt initially denied dizziness however after marching in [place] [id number] pt with c/o dizziness & returned to sitting. Dr. Peraza aware./impaired balance/decreased strength

## 2021-10-29 NOTE — DISCHARGE NOTE PROVIDER - PROVIDER TOKENS
PROVIDER:[TOKEN:[[id number]],FOLLOWUP:[2 weeks]],FREE:[LAST:[Asirwatham],FIRST:[Kamalini],PHONE:[(   )    -],FAX:[(   )    -],FOLLOWUP:[1 week]],FREE:[LAST:[asirwatham],FIRST:[vamsii],PHONE:[(   )    -],FAX:[(   )    -],ESTABLISHEDPATIENT:[T]]

## 2021-10-29 NOTE — PHYSICAL THERAPY INITIAL EVALUATION ADULT - PERTINENT HX OF CURRENT PROBLEM, REHAB EVAL
Patient states that over the last three years since he was diagnosed with Parkinson's in 2019 he has had multiple instances of dizziness/fainting.This week he has had two episodes,He does not recall the episodes, but patient's wife witnessed various small lacerations on his face and determined that he had fell. 1st  happened on Tuesday night - fall was unwitnessed, but patient's wife called EMS. Vitals were fine and he refused transport to hospital. 2nd happened day of presentation.

## 2021-10-30 VITALS
HEART RATE: 77 BPM | RESPIRATION RATE: 20 BRPM | DIASTOLIC BLOOD PRESSURE: 83 MMHG | SYSTOLIC BLOOD PRESSURE: 171 MMHG | TEMPERATURE: 97 F

## 2021-10-30 LAB
ALBUMIN SERPL ELPH-MCNC: 3.8 G/DL — SIGNIFICANT CHANGE UP (ref 3.5–5.2)
ALP SERPL-CCNC: 97 U/L — SIGNIFICANT CHANGE UP (ref 30–115)
ALT FLD-CCNC: <5 U/L — SIGNIFICANT CHANGE UP (ref 0–41)
ANION GAP SERPL CALC-SCNC: 11 MMOL/L — SIGNIFICANT CHANGE UP (ref 7–14)
AST SERPL-CCNC: 15 U/L — SIGNIFICANT CHANGE UP (ref 0–41)
BASOPHILS # BLD AUTO: 0.05 K/UL — SIGNIFICANT CHANGE UP (ref 0–0.2)
BASOPHILS NFR BLD AUTO: 0.8 % — SIGNIFICANT CHANGE UP (ref 0–1)
BILIRUB SERPL-MCNC: 0.8 MG/DL — SIGNIFICANT CHANGE UP (ref 0.2–1.2)
BUN SERPL-MCNC: 21 MG/DL — HIGH (ref 10–20)
CALCIUM SERPL-MCNC: 8.7 MG/DL — SIGNIFICANT CHANGE UP (ref 8.5–10.1)
CHLORIDE SERPL-SCNC: 103 MMOL/L — SIGNIFICANT CHANGE UP (ref 98–110)
CO2 SERPL-SCNC: 24 MMOL/L — SIGNIFICANT CHANGE UP (ref 17–32)
CREAT SERPL-MCNC: 0.8 MG/DL — SIGNIFICANT CHANGE UP (ref 0.7–1.5)
EOSINOPHIL # BLD AUTO: 0.22 K/UL — SIGNIFICANT CHANGE UP (ref 0–0.7)
EOSINOPHIL NFR BLD AUTO: 3.5 % — SIGNIFICANT CHANGE UP (ref 0–8)
GLUCOSE SERPL-MCNC: 100 MG/DL — HIGH (ref 70–99)
HCT VFR BLD CALC: 44.2 % — SIGNIFICANT CHANGE UP (ref 42–52)
HGB BLD-MCNC: 14.7 G/DL — SIGNIFICANT CHANGE UP (ref 14–18)
IMM GRANULOCYTES NFR BLD AUTO: 0.3 % — SIGNIFICANT CHANGE UP (ref 0.1–0.3)
LYMPHOCYTES # BLD AUTO: 1.64 K/UL — SIGNIFICANT CHANGE UP (ref 1.2–3.4)
LYMPHOCYTES # BLD AUTO: 25.9 % — SIGNIFICANT CHANGE UP (ref 20.5–51.1)
MAGNESIUM SERPL-MCNC: 2.3 MG/DL — SIGNIFICANT CHANGE UP (ref 1.8–2.4)
MCHC RBC-ENTMCNC: 30.1 PG — SIGNIFICANT CHANGE UP (ref 27–31)
MCHC RBC-ENTMCNC: 33.3 G/DL — SIGNIFICANT CHANGE UP (ref 32–37)
MCV RBC AUTO: 90.6 FL — SIGNIFICANT CHANGE UP (ref 80–94)
MONOCYTES # BLD AUTO: 0.6 K/UL — SIGNIFICANT CHANGE UP (ref 0.1–0.6)
MONOCYTES NFR BLD AUTO: 9.5 % — HIGH (ref 1.7–9.3)
NEUTROPHILS # BLD AUTO: 3.8 K/UL — SIGNIFICANT CHANGE UP (ref 1.4–6.5)
NEUTROPHILS NFR BLD AUTO: 60 % — SIGNIFICANT CHANGE UP (ref 42.2–75.2)
NRBC # BLD: 0 /100 WBCS — SIGNIFICANT CHANGE UP (ref 0–0)
PLATELET # BLD AUTO: 190 K/UL — SIGNIFICANT CHANGE UP (ref 130–400)
POTASSIUM SERPL-MCNC: 4.2 MMOL/L — SIGNIFICANT CHANGE UP (ref 3.5–5)
POTASSIUM SERPL-SCNC: 4.2 MMOL/L — SIGNIFICANT CHANGE UP (ref 3.5–5)
PROT SERPL-MCNC: 6.2 G/DL — SIGNIFICANT CHANGE UP (ref 6–8)
RBC # BLD: 4.88 M/UL — SIGNIFICANT CHANGE UP (ref 4.7–6.1)
RBC # FLD: 13 % — SIGNIFICANT CHANGE UP (ref 11.5–14.5)
SODIUM SERPL-SCNC: 138 MMOL/L — SIGNIFICANT CHANGE UP (ref 135–146)
WBC # BLD: 6.33 K/UL — SIGNIFICANT CHANGE UP (ref 4.8–10.8)
WBC # FLD AUTO: 6.33 K/UL — SIGNIFICANT CHANGE UP (ref 4.8–10.8)

## 2021-10-30 PROCEDURE — 99239 HOSP IP/OBS DSCHRG MGMT >30: CPT

## 2021-10-30 RX ORDER — MIDODRINE HYDROCHLORIDE 2.5 MG/1
1 TABLET ORAL
Qty: 60 | Refills: 3
Start: 2021-10-30 | End: 2022-02-26

## 2021-10-30 RX ORDER — DROXIDOPA 100 MG/1
1 CAPSULE ORAL
Qty: 0 | Refills: 0 | DISCHARGE

## 2021-10-30 RX ADMIN — FLUDROCORTISONE ACETATE 0.1 MILLIGRAM(S): 0.1 TABLET ORAL at 05:37

## 2021-10-30 RX ADMIN — CARBIDOPA AND LEVODOPA 1 TABLET(S): 25; 100 TABLET ORAL at 05:37

## 2021-10-30 RX ADMIN — MIDODRINE HYDROCHLORIDE 5 MILLIGRAM(S): 2.5 TABLET ORAL at 05:37

## 2021-10-30 NOTE — PROGRESS NOTE ADULT - ASSESSMENT
75 y/o male with Parkinson's disease, macular degeneration (R eye), orthostatic hypotension presents to [facility] after a series of falls/syncopal episodes.     A/P:   Recurrent Fall   Orthostatic Hypotension due to autonomic dysfunction, chronic.   Head trauma due to fall, no loss of consciousness.   Trauma work up   Still orthostatic, no other reason for orthostatic hypotension.   Continue Droxidopa may increase the dose to 200mg TID.   Continue Fludrocortisone.   Doubt any benefit from Midodrine as BP is already high.   Advised with high compressing stocking and abdominal binder all the daytime, can be removed when he go to sleep.   PT evaluation. Fall precaution.     Parkinson's disease:   Continue carbidopa/levodopa at current dose.  Follow up with his neurologist at [facility].     Macular degeneration  Outpatient management      DVT prophylaxis - lovenox 40mg sub-q QD    #Progress Note Handoff:  Pending (specify):  PT evaluation  Family discussion: with his wife and his son, explained the nature of disease, advised with PT. Discharge plan for tomorrow   Disposition: Home with home care tomorrow. 
75 y/o male with Parkinson's disease, macular degeneration (R eye), orthostatic hypotension presents to [facility] after a series of falls/syncopal episodes.     A/P:   Recurrent Fall   Orthostatic Hypotension due to autonomic dysfunction, chronic.   Head trauma due to fall, no loss of consciousness.   Trauma work up   Still orthostatic, no other reason for orthostatic hypotension.   Continue Droxidopa may increase the dose to gradually 100mg TID every 48 hrs to reach 600mg TID. .   Continue Fludrocortisone 0.1mg daily.   add Midodrine 5mg BID as needed if Northera is not helpful.   Advised with high compressing stocking and abdominal binder all the daytime, can be removed when he go to sleep.   PT evaluation. Fall precaution.     Parkinson's disease:   Continue carbidopa/levodopa at current dose.  Follow up with his neurologist at [facility].     Macular degeneration  Outpatient management     DVT prophylaxis - lovenox 40mg sub-q QD    #Progress Note Handoff:  Pending (specify):    Family discussion: with his wife and his son, explained the nature of disease, advised with PT outpatient.   Disposition: Home with home care today.

## 2021-10-30 NOTE — DISCHARGE NOTE NURSING/CASE MANAGEMENT/SOCIAL WORK - PATIENT PORTAL LINK FT
You can access the FollowMyHealth Patient Portal offered by [facility] by registering at the following website: [email or web address]. By joining [facility]’s FollowMyHealth portal, you will also be able to view your health information using other applications (apps) compatible with our system.

## 2021-10-30 NOTE — DISCHARGE NOTE NURSING/CASE MANAGEMENT/SOCIAL WORK - NSDCVIVACCINE_GEN_ALL_CORE_FT
Tdap; 28-Oct-2021 11:57; Marlene Reeder (HANNA); [employer]; [id number] (Exp. Date: 09-Sep-2023); IntraMuscular; Deltoid Right.; 0.5 milliLiter(s); VIS (VIS Published: 09-May-2013, VIS Presented: 28-Oct-2021);

## 2021-10-30 NOTE — PROGRESS NOTE ADULT - SUBJECTIVE AND OBJECTIVE BOX
CALEB ENCARNACION  76y  Male      Patient is a 76y old  Male who presents with a chief complaint of syncope/fall (29 Oct 2021 17:05)      INTERVAL HPI/OVERNIGHT EVENTS:  He feels ok, he wants to go home, he was very dizzy when he stood up for 2 minutes and was orthostatic.   Vital Signs Last 24 Hrs  T(C): 36.3 (30 Oct 2021 12:47), Max: 36.6 (29 Oct 2021 20:00)  T(F): 97.3 (30 Oct 2021 12:47), Max: 97.8 (29 Oct 2021 20:00)  HR: 77 (30 Oct 2021 12:47) (58 - 77)  BP: 171/83 (30 Oct 2021 12:47) (72/54 - 183/88)  BP(mean): --  RR: 20 (30 Oct 2021 12:47) (18 - 20)  SpO2: --      10-29-21 @ 07:01  -  10-30-21 @ 07:00  --------------------------------------------------------  IN: 300 mL / OUT: 800 mL / NET: -500 mL    10-30-21 @ 07:01  -  10-30-21 @ 13:52  --------------------------------------------------------  IN: 650 mL / OUT: 300 mL / NET: 350 mL            Consultant(s) Notes Reviewed:  [x ] YES  [ ] NO          MEDICATIONS  (STANDING):  carbidopa/levodopa  25/100 1 Tablet(s) Oral four times a day  chlorhexidine 4% Liquid 1 Application(s) Topical once  enoxaparin Injectable 40 milliGRAM(s) SubCutaneous daily  fludroCORTISONE 0.1 milliGRAM(s) Oral daily  midodrine. 5 milliGRAM(s) Oral three times a day  multivitamin 1 Tablet(s) Oral daily    MEDICATIONS  (PRN):      LABS                          14.7   6.33  )-----------( 190      ( 30 Oct 2021 05:45 )             44.2     10-30    138  |  103  |  21<H>  ----------------------------<  100<H>  4.2   |  24  |  0.8    Ca    8.7      30 Oct 2021 05:45  Mg     2.3     10-30    TPro  6.2  /  Alb  3.8  /  TBili  0.8  /  DBili  x   /  AST  15  /  ALT  <5  /  AlkPhos  97  10-30          Lactate Trend        CAPILLARY BLOOD GLUCOSE      POCT Blood Glucose.: 97 mg/dL (29 Oct 2021 08:01)        RADIOLOGY & ADDITIONAL TESTS:    Imaging Personally Reviewed:  [ ] YES  [ ] NO    HEALTH ISSUES - PROBLEM Dx:          PHYSICAL EXAM:  GENERAL: NAD, well-developed.  HEAD:  Atraumatic, Normocephalic.  EYES: EOMI, PERRLA, conjunctiva and sclera clear.  NECK: Supple, No JVD.  CHEST/LUNG: Clear to auscultation bilaterally; No wheeze.  HEART: Regular rate and rhythm; S1 S2.   ABDOMEN: Soft, Nontender, Nondistended; Bowel sounds present.  EXTREMITIES:  2+ Peripheral Pulses, No clubbing, cyanosis, or edema.  PSYCH: AAOx3.  NEUROLOGY: non-focal.  SKIN: No rashes or lesions.

## 2021-11-04 DIAGNOSIS — I95.1 ORTHOSTATIC HYPOTENSION: ICD-10-CM

## 2021-11-04 DIAGNOSIS — H35.30 UNSPECIFIED MACULAR DEGENERATION: ICD-10-CM

## 2021-11-04 DIAGNOSIS — R29.6 REPEATED FALLS: ICD-10-CM

## 2021-11-04 DIAGNOSIS — G20 PARKINSON'S DISEASE: ICD-10-CM
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
## 2021-11-04 DIAGNOSIS — M35.9 SYSTEMIC INVOLVEMENT OF CONNECTIVE TISSUE, UNSPECIFIED: ICD-10-CM
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
## 2021-11-04 DIAGNOSIS — S01.81XA LACERATION WITHOUT FOREIGN BODY OF OTHER PART OF HEAD, INITIAL ENCOUNTER: ICD-10-CM

## 2021-11-04 DIAGNOSIS — R55 SYNCOPE AND COLLAPSE: ICD-10-CM

## 2025-07-25 NOTE — ED ADULT NURSE NOTE - TEMPLATE LIST FOR HEAD TO TOE ASSESSMENT
Patient called office stating she has been taking the colchicine for her gout but starting this morning she is having pain in both of her knees. Patient states the pain is right at the joint and occurs when she goes to stand up, making it hard for her to even stand at times. Patient states she has never felt a pain like this, but denies any swelling or injury to her knees. Patient states she took some Tylenol for the pain.    
Patient scheduled   
Fall